# Patient Record
Sex: FEMALE | Race: WHITE | URBAN - METROPOLITAN AREA
[De-identification: names, ages, dates, MRNs, and addresses within clinical notes are randomized per-mention and may not be internally consistent; named-entity substitution may affect disease eponyms.]

---

## 2017-10-01 ENCOUNTER — APPOINTMENT (OUTPATIENT)
Dept: GENERAL RADIOLOGY | Facility: CLINIC | Age: 67
End: 2017-10-01
Attending: EMERGENCY MEDICINE
Payer: COMMERCIAL

## 2017-10-01 ENCOUNTER — HOSPITAL ENCOUNTER (EMERGENCY)
Facility: CLINIC | Age: 67
Discharge: HOME | End: 2017-10-01
Attending: EMERGENCY MEDICINE | Admitting: EMERGENCY MEDICINE
Payer: COMMERCIAL

## 2017-10-01 ENCOUNTER — APPOINTMENT (OUTPATIENT)
Dept: CT IMAGING | Facility: CLINIC | Age: 67
End: 2017-10-01
Attending: EMERGENCY MEDICINE
Payer: COMMERCIAL

## 2017-10-01 VITALS
RESPIRATION RATE: 16 BRPM | HEART RATE: 89 BPM | WEIGHT: 170 LBS | BODY MASS INDEX: 26.68 KG/M2 | HEIGHT: 67 IN | SYSTOLIC BLOOD PRESSURE: 165 MMHG | TEMPERATURE: 98.1 F | DIASTOLIC BLOOD PRESSURE: 102 MMHG | OXYGEN SATURATION: 98 %

## 2017-10-01 DIAGNOSIS — S16.1XXA STRAIN OF NECK MUSCLE, INITIAL ENCOUNTER: ICD-10-CM

## 2017-10-01 DIAGNOSIS — S30.0XXA CONTUSION OF PELVIS, INITIAL ENCOUNTER: ICD-10-CM

## 2017-10-01 PROCEDURE — 25000132 ZZH RX MED GY IP 250 OP 250 PS 637: Performed by: EMERGENCY MEDICINE

## 2017-10-01 PROCEDURE — 99284 EMERGENCY DEPT VISIT MOD MDM: CPT | Mod: 25

## 2017-10-01 PROCEDURE — 72125 CT NECK SPINE W/O DYE: CPT

## 2017-10-01 PROCEDURE — 72170 X-RAY EXAM OF PELVIS: CPT

## 2017-10-01 RX ORDER — CYCLOBENZAPRINE HCL 10 MG
10 TABLET ORAL EVERY 8 HOURS PRN
Qty: 20 TABLET | Refills: 0 | Status: SHIPPED | OUTPATIENT
Start: 2017-10-01 | End: 2019-11-18

## 2017-10-01 RX ORDER — ACETAMINOPHEN 500 MG
1000 TABLET ORAL ONCE
Status: COMPLETED | OUTPATIENT
Start: 2017-10-01 | End: 2017-10-01

## 2017-10-01 RX ADMIN — ACETAMINOPHEN 1000 MG: 500 TABLET, FILM COATED ORAL at 15:02
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
## 2017-10-01 NOTE — ED AVS SNAPSHOT
Emergency Department    [address] [place] [address]    Phone:  [phone]    Fax:  [phone]                                       Yue Mancilla   MRN: [id number]    Department:   Emergency Department   Date of Visit:  10/1/2017           After Visit Summary Signature Page     I have received my discharge instructions, and my questions have been answered. I have discussed any challenges I see with this plan with the nurse or doctor.    ..........................................................................................................................................  Patient/Patient Representative Signature      ..........................................................................................................................................  Patient Representative Print Name and Relationship to Patient    ..................................................               ................................................  Date                                            Time    ..........................................................................................................................................  Reviewed by Signature/Title    ...................................................              ..............................................  Date                                                            Time
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
## 2018-08-14 ENCOUNTER — TRANSFERRED RECORDS (OUTPATIENT)
Dept: HEALTH INFORMATION MANAGEMENT | Facility: CLINIC | Age: 68
End: 2018-08-14

## 2018-08-16 ENCOUNTER — PRE VISIT (OUTPATIENT)
Dept: DERMATOLOGY | Facility: CLINIC | Age: 68
End: 2018-08-16

## 2018-08-16 NOTE — TELEPHONE ENCOUNTER
FUTURE VISIT INFORMATION      FUTURE VISIT INFORMATION:    Date: N/A    Time:     Location:   REFERRAL INFORMATION:    Referring provider:  DR. CLEVELAND CAMPBELL    Referring providers clinic:  [facility]    Reason for visit/diagnosis  HAIRLOSS    RECORDS STATUS      RECORDS RECEIVED FROM: [facility]   DATE RECEIVED: 8/14/18   NOTES STATUS DETAILS   OFFICE NOTE from referring provider Received    OFFICE NOTE from other specialist N/A    SCALP BIOPSY RESULTS     HAIR LOSS PHOTOS     LAB RESULTS     MEDICATION LIST Received      Action    Action Taken ALL RECORDS RECEIVED AND WILL BE REVIEWED BY DR. DRAKE WITHIN 2-3 WEEKS

## 2018-08-21 ENCOUNTER — MEDICAL CORRESPONDENCE (OUTPATIENT)
Dept: HEALTH INFORMATION MANAGEMENT | Facility: CLINIC | Age: 68
End: 2018-08-21

## 2018-09-06 ENCOUNTER — TELEPHONE (OUTPATIENT)
Dept: DERMATOLOGY | Facility: CLINIC | Age: 68
End: 2018-09-06

## 2018-09-06 NOTE — TELEPHONE ENCOUNTER
Writer called patient to inform her she has been accepted into Dr. Hampton's [facility] clinic. Patient is scheduled on 12/11/18 for an appointment.

## 2018-09-06 NOTE — TELEPHONE ENCOUNTER
[facility] Call Center    Phone Message    May a detailed message be left on voicemail: yes    Reason for Call: Other: Pt called wanting an update on the status of her hairloss referral to Dr. Hampton. Called procedure  but was not able to get a hold of them. Please call pt back with update. Thanks     Action Taken: Message routed to:  [facility] ([facility] Dermatology

## 2018-09-06 NOTE — TELEPHONE ENCOUNTER
Action    Action Taken DR. DRAKE HAS ACCEPTED PATIENT INTO CLINIC. PATIENT MAY SCHEDULE [facility] APPOINTMENT.

## 2018-12-08 LAB — MAMMOGRAM: NORMAL

## 2018-12-11 ENCOUNTER — OFFICE VISIT (OUTPATIENT)
Dept: DERMATOLOGY | Facility: CLINIC | Age: 68
End: 2018-12-11
Payer: MEDICARE

## 2018-12-11 ENCOUNTER — PATIENT OUTREACH (OUTPATIENT)
Dept: CARE COORDINATION | Facility: CLINIC | Age: 68
End: 2018-12-11

## 2018-12-11 VITALS — HEART RATE: 76 BPM | DIASTOLIC BLOOD PRESSURE: 69 MMHG | SYSTOLIC BLOOD PRESSURE: 128 MMHG

## 2018-12-11 DIAGNOSIS — E55.9 VITAMIN D DEFICIENCY: ICD-10-CM

## 2018-12-11 DIAGNOSIS — L65.9 LOSS OF HAIR: ICD-10-CM

## 2018-12-11 DIAGNOSIS — L29.9 SCALP PRURITUS: ICD-10-CM

## 2018-12-11 DIAGNOSIS — L65.9 ALOPECIA: Primary | ICD-10-CM

## 2018-12-11 PROCEDURE — 88305 TISSUE EXAM BY PATHOLOGIST: CPT | Mod: TC | Performed by: DERMATOLOGY

## 2018-12-11 RX ORDER — LIDOCAINE 50 MG/G
OINTMENT TOPICAL
COMMUNITY
Start: 2018-02-01

## 2018-12-11 RX ORDER — LOSARTAN POTASSIUM 50 MG/1
50 TABLET ORAL
COMMUNITY
Start: 2018-05-03

## 2018-12-11 RX ORDER — KETOCONAZOLE 20 MG/ML
SHAMPOO TOPICAL
COMMUNITY
Start: 2018-10-16 | End: 2019-11-18

## 2018-12-11 ASSESSMENT — PAIN SCALES - GENERAL
PAINLEVEL: MODERATE PAIN (5)
PAINLEVEL: NO PAIN (0)

## 2018-12-11 NOTE — PROGRESS NOTES
Lidocaine-epinephrine 1-1:485363 % injection   0.5mL once for one use, starting 12/11/2018 ending 12/11/2018,  2mL disp, R-0, injection  Injected by Dr. Wan
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
## 2018-12-11 NOTE — PATIENT INSTRUCTIONS

## 2018-12-11 NOTE — NURSING NOTE
Dermatology Rooming Note    Yue Mancilla's goals for this visit include:   Chief Complaint   Patient presents with     Derm Problem     Yue is here for concerns of LPP, was referred by Dr. Thomas Davenport.       Trudy Simon LPN
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
## 2018-12-17 ENCOUNTER — TRANSFERRED RECORDS (OUTPATIENT)
Dept: HEALTH INFORMATION MANAGEMENT | Facility: CLINIC | Age: 68
End: 2018-12-17

## 2019-01-02 LAB — COPATH REPORT: NORMAL

## 2019-01-18 LAB — LAB SCANNED RESULT: NORMAL

## 2019-01-31 ENCOUNTER — DOCUMENTATION ONLY (OUTPATIENT)
Dept: DERMATOLOGY | Facility: CLINIC | Age: 69
End: 2019-01-31

## 2019-01-31 NOTE — PROGRESS NOTES
Records received from [facility] 12/19.Dr Hampton reviewed records and will be sent to scanning.

## 2019-02-17 ENCOUNTER — MYC MEDICAL ADVICE (OUTPATIENT)
Dept: DERMATOLOGY | Facility: CLINIC | Age: 69
End: 2019-02-17

## 2019-02-17 ENCOUNTER — TELEPHONE (OUTPATIENT)
Dept: DERMATOLOGY | Facility: CLINIC | Age: 69
End: 2019-02-17

## 2019-02-17 DIAGNOSIS — R20.2 PARESTHESIA: Primary | ICD-10-CM

## 2019-02-18 NOTE — TELEPHONE ENCOUNTER
This afternoon I had the opportunity to speak with MsCatalino Laurent regarding her data from her recent dermatology visit. She reports that she continues to exp

## 2019-02-18 NOTE — TELEPHONE ENCOUNTER
"This afternoon I had the opportunity to review the data from Ms. Mancilla's recent visits to dermatology clinic. Since this visit, she notes she continues to have a symptomatic scalp and does use a betamethasone solution as needed She also notes she has a very difficult case of sleep apnea and does wear a device with an attached head gear but this does not seem to aggravate her scalp symptoms. She continues to color her hair monthly and does note some irritation at the nape of her neck but notes this is different and in a different location from where she experiences the \"creepy crawly\" sensation in her skin. Finally, Ms. Mancilla notes she started to use 2% ketoconazole in November and did find this to be helpful already in December. She now uses this about 2 to 3 times per week. She also saw Dr. Davenport last week and received IL injections to the scalp.    Her biopsy and epidermal nerve reports were as follows:    The most notable findings in this specimen are of thinned epidermis and   abundant solar elastosis, reflecting chronic photodamage.  Bacterial colonies are seen within follicular ostia, raising the possibility of an   occult folliculitis, though no significant perifollicular inflammation is   seen.  No definite features of a primary alopecia or of an active inflammatory process are identified on multiple level sections.  Clinical   correlation is recommended.     Vertical sections demonstrate 10 hair follicles, one of which is vellus,   the rest terminal, and 8 in anagen phase and 2 in telogen.  Sebaceous glands are retained, no scarring fibrous tracts are seen, and the overlying   epidermis appears thinned but otherwise normal.  A sparse perivascular   lymphocytic infiltrate is present, but no definite active inflammatory process is identified.     The nerve study showed a decrease in epidermal nerve fiber density suggestive of a neuropathy as well as mild mast cell degranulation in the mid and deep dermis. " "    Based on this information,the following is recommended:  1. Use 2% ketoconazole at least 3 times per week - every other day  2. Add 6% topical gabapentin twice a day  3. Begin Allegra 180 daily  4. Keep April appointment  5. Focus on hair regrowth after both neurogenic and more \"normal\" inflammation are under control.    Patient was seen and examined with the dermatology resident. I agree with the history, review of systems, physical examination, assessments and plan.      Rachel Hampton MD  Professor and  Chair  Department of Dermatology  [facility]    "

## 2019-04-08 ENCOUNTER — OFFICE VISIT (OUTPATIENT)
Dept: DERMATOLOGY | Facility: CLINIC | Age: 69
End: 2019-04-08
Payer: MEDICARE

## 2019-04-08 DIAGNOSIS — L65.9 ALOPECIA: ICD-10-CM

## 2019-04-08 DIAGNOSIS — L30.9 DERMATITIS: Primary | ICD-10-CM

## 2019-04-08 DIAGNOSIS — R20.2 PARESTHESIA: ICD-10-CM

## 2019-04-08 DIAGNOSIS — L29.9 SCALP PRURITUS: ICD-10-CM

## 2019-04-08 RX ORDER — FLUOCINOLONE ACETONIDE 0.11 MG/ML
OIL TOPICAL
Qty: 118 BOTTLE | Refills: 3 | Status: SHIPPED | OUTPATIENT
Start: 2019-04-08 | End: 2019-07-29

## 2019-04-08 ASSESSMENT — PAIN SCALES - GENERAL: PAINLEVEL: NO PAIN (1)

## 2019-04-08 NOTE — LETTER
"4/8/2019       RE: Yue Mancilla  [address] [facility] [address]     Dear Colleague,    Thank you for referring your patient, Yue Mancilla, to the [facility] DERMATOLOGY at [facility]. Please see a copy of my visit note below.    [facility] Dermatology Note      Dermatology Problem List:  1. Symptomatic Scalp - itching,burning and tenderness with associated hair loss. 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 Punch biopsy x 2 for H&E and nerve fiber studies 12/11/18.    Continue gabapentin 6% daily    Continue ketoconazole shampoo every other day     Continue allegra 180 mg daily     Start Derma-Smoothe/FS 1x weekly   -Previous tx: ILK, topical betamethasone    Encounter Date: Apr 8, 2019    CC:  Chief Complaint   Patient presents with     Hair Loss     Yue is here today for a 4 month hair loss follow up. Yue notes\" Im doing good\"          History of Present Illness:  Ms. Yue Mancilla is a 68 year old female who presents as a follow-up for symptomatic scalp. The patient was last seen 12/11/2018 when her scalp was biopsied. She does not experience burning and itching sensations as often and the tingling is almost gone. She took a trip without her gabapentin for 4 days and the symptoms are coming back. She feels that her hair is shedding less. She also notes that her skin is dry.     Current treatment includes 2% ketoconazole at least 3 times per week, 6% topical gabapentin twice a day, and Allegra 180 daily.     No new medications or medical conditions.     She fell and damaged her rotator cuff, she will have surgery later this month.     Past Medical History:   There is no problem list on file for this patient.    Past Medical History:   Diagnosis Date     DJD (degenerative joint disease)      Sleep apnea      No past surgical history on file.    Social History:  Patient reports that she has quit smoking. She has never used smokeless tobacco. She reports that she " drinks alcohol. She reports that she does not use drugs.    Family History:  No family history on file.    Medications:  Current Outpatient Medications   Medication Sig Dispense Refill     Calcium-Magnesium-Vitamin D (CALCIUM 500 PO)        Celecoxib (CELEBREX PO)        Cholecalciferol (VITAMIN D PO)        gabapentin 6 % SOLN solution Apply 1 ml twice a day to affected scalp 60 mL 3     ketoconazole (NIZORAL) 2 % external shampoo        lidocaine (XYLOCAINE) 5 % external ointment        losartan (COZAAR) 50 MG tablet Take 50 mg by mouth       Omeprazole (PRILOSEC PO)        vitamin B complex with vitamin C (VITAMIN  B COMPLEX) tablet Take 1 tablet by mouth daily       VITAMIN E EX        cyclobenzaprine (FLEXERIL) 10 MG tablet Take 1 tablet (10 mg) by mouth every 8 hours as needed for muscle spasms No driving a car or drinking alcohol for 8 hours after taking this medication. (Patient not taking: Reported on 12/11/2018) 20 tablet 0     Allergies   Allergen Reactions     Ceclor [Cefaclor]      Lisinopril Cough         Review of Systems:  -As per HPI  -Constitutional: Otherwise feeling well today, in usual state of health.  -HEENT: Patient denies nonhealing oral sores.  -Skin: As above in HPI. No additional skin concerns.    Physical exam:  Vitals: There were no vitals taken for this visit.  GEN: This is a well developed, well-nourished female in no acute distress, in a pleasant mood.    SKIN: Focused examination of the scalp, face, nails was performed.  - increased hair density along the frontal hair line, with new growth fibers  - Scalp is diffusely erythematous similar to previous photos  - follicular accentuation and scale throughout scalp  - bilateral eyebrows and lashes are full   - morphology of nails is normal  -No other lesions of concern on areas examined.     Impression/Plan:  1. Symptomatic scalp - low density of nerves can be associated with a neuropathy      Continue gabapentin 6% daily    Continue  ketoconazole shampoo every other day     Continue allegra 180 mg daily    Start Derma-Smoothe/FS 1x weekly       Follow-up in 3 months, earlier for new or changing lesions.     Staff Involved:  Prerna Hayden MD  Dermatology Research Fellow     Patient was seen and examined with the clinical research fellow.  I agree with the history, review of systems, physical examination, assessments and plan.    Rachel Hampton MD  Professor and  Chair  Department of Dermatology  [facility]

## 2019-04-08 NOTE — NURSING NOTE
"Dermatology Rooming Note    Yue Mancilla's goals for this visit include:   Chief Complaint   Patient presents with     Hair Loss     Yue is here today for a 4 month hair loss follow up. Yue notes\" Im doing good\"      CARRIE Rolon    "

## 2019-04-08 NOTE — PROGRESS NOTES
"[facility] Dermatology Note      Dermatology Problem List:  1. Symptomatic Scalp - itching,burning and tenderness with associated hair loss. 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 Punch biopsy x 2 for H&E and nerve fiber studies 12/11/18.    Continue gabapentin 6% daily    Continue ketoconazole shampoo every other day     Continue allegra 180 mg daily     Start Derma-Smoothe/FS 1x weekly   -Previous tx: ILK, topical betamethasone    Encounter Date: Apr 8, 2019    CC:  Chief Complaint   Patient presents with     Hair Loss     Yue is here today for a 4 month hair loss follow up. Yue notes\" Im doing good\"          History of Present Illness:  Ms. Yue Mancilla is a 68 year old female who presents as a follow-up for symptomatic scalp. The patient was last seen 12/11/2018 when her scalp was biopsied. She does not experience burning and itching sensations as often and the tingling is almost gone. She took a trip without her gabapentin for 4 days and the symptoms are coming back. She feels that her hair is shedding less. She also notes that her skin is dry.     Current treatment includes 2% ketoconazole at least 3 times per week, 6% topical gabapentin twice a day, and Allegra 180 daily.     No new medications or medical conditions.     She fell and damaged her rotator cuff, she will have surgery later this month.     Past Medical History:   There is no problem list on file for this patient.    Past Medical History:   Diagnosis Date     DJD (degenerative joint disease)      Sleep apnea      No past surgical history on file.    Social History:  Patient reports that she has quit smoking. She has never used smokeless tobacco. She reports that she drinks alcohol. She reports that she does not use drugs.    Family History:  No family history on file.    Medications:  Current Outpatient Medications   Medication Sig Dispense Refill     Calcium-Magnesium-Vitamin D (CALCIUM 500 PO)        Celecoxib (CELEBREX PO)    "     Cholecalciferol (VITAMIN D PO)        gabapentin 6 % SOLN solution Apply 1 ml twice a day to affected scalp 60 mL 3     ketoconazole (NIZORAL) 2 % external shampoo        lidocaine (XYLOCAINE) 5 % external ointment        losartan (COZAAR) 50 MG tablet Take 50 mg by mouth       Omeprazole (PRILOSEC PO)        vitamin B complex with vitamin C (VITAMIN  B COMPLEX) tablet Take 1 tablet by mouth daily       VITAMIN E EX        cyclobenzaprine (FLEXERIL) 10 MG tablet Take 1 tablet (10 mg) by mouth every 8 hours as needed for muscle spasms No driving a car or drinking alcohol for 8 hours after taking this medication. (Patient not taking: Reported on 12/11/2018) 20 tablet 0     Allergies   Allergen Reactions     Ceclor [Cefaclor]      Lisinopril Cough         Review of Systems:  -As per HPI  -Constitutional: Otherwise feeling well today, in usual state of health.  -HEENT: Patient denies nonhealing oral sores.  -Skin: As above in HPI. No additional skin concerns.    Physical exam:  Vitals: There were no vitals taken for this visit.  GEN: This is a well developed, well-nourished female in no acute distress, in a pleasant mood.    SKIN: Focused examination of the scalp, face, nails was performed.  - increased hair density along the frontal hair line, with new growth fibers  - Scalp is diffusely erythematous similar to previous photos  - follicular accentuation and scale throughout scalp  - bilateral eyebrows and lashes are full   - morphology of nails is normal  -No other lesions of concern on areas examined.     Impression/Plan:  1. Symptomatic scalp - low density of nerves can be associated with a neuropathy      Continue gabapentin 6% daily    Continue ketoconazole shampoo every other day     Continue allegra 180 mg daily    Start Derma-Smoothe/FS 1x weekly       Follow-up in 3 months, earlier for new or changing lesions.     Staff Involved:  Prerna Hayden MD  Dermatology Research Fellow     Patient was seen and  examined with the clinical research fellow.  I agree with the history, review of systems, physical examination, assessments and plan.    Rachel Hampton MD  Professor and  Chair  Department of Dermatology  [facility]

## 2019-06-27 ENCOUNTER — DOCUMENTATION ONLY (OUTPATIENT)
Dept: CARE COORDINATION | Facility: CLINIC | Age: 69
End: 2019-06-27

## 2019-07-29 ENCOUNTER — OFFICE VISIT (OUTPATIENT)
Dept: DERMATOLOGY | Facility: CLINIC | Age: 69
End: 2019-07-29
Payer: MEDICARE

## 2019-07-29 VITALS — DIASTOLIC BLOOD PRESSURE: 79 MMHG | HEART RATE: 87 BPM | SYSTOLIC BLOOD PRESSURE: 120 MMHG

## 2019-07-29 DIAGNOSIS — R20.2 PARESTHESIA: ICD-10-CM

## 2019-07-29 DIAGNOSIS — L30.9 DERMATITIS: ICD-10-CM

## 2019-07-29 RX ORDER — NYSTATIN 100000 U/G
CREAM TOPICAL 2 TIMES DAILY
COMMUNITY

## 2019-07-29 RX ORDER — FLUOCINOLONE ACETONIDE 0.11 MG/ML
OIL TOPICAL
Qty: 118 BOTTLE | Refills: 3 | Status: SHIPPED | OUTPATIENT
Start: 2019-07-29

## 2019-07-29 ASSESSMENT — PAIN SCALES - GENERAL: PAINLEVEL: NO PAIN (0)

## 2019-07-29 NOTE — NURSING NOTE
"Dermatology Rooming Note    Yue Mancilla's goals for this visit include:   Chief Complaint   Patient presents with     Hair Loss     Yue is here today for a hair loss follow up- Yue states \"it's been hard putting some of my medications on my scalp since my surgery\".      Farheen Montemayor, RMLEIGH     "

## 2019-07-29 NOTE — PATIENT INSTRUCTIONS
In about 6 weeks, discuss your scalp symptoms with Dr. Theresa Malave. If not improved, consider adding 1 pill of gabapentin each night.    For under the breasts, you can try both over the counter hydrocortisone and Lotrimin or clotrimazole as needed.

## 2019-07-29 NOTE — LETTER
"7/29/2019       RE: Yue Mancilla  [address] [facility] [address]     Dear Colleague,    Thank you for referring your patient, Yue Mancilla, to the [facility] DERMATOLOGY at [facility]. Please see a copy of my visit note below.                    Pictures were placed in Pt's chart today for future reference.      [facility] Dermatology Note      Dermatology Problem List:  1. Symptomatic Scalp - itching,burning and tenderness with associated hair loss. 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 Punch biopsy x 2 for H&E (normal scalp) and nerve fiber studies 12/11/18 (low density of nerve fibers) compatible with a neuropathy    Continue gabapentin 6% daily    Continue 2% ketoconazole shampoo every other day     Continue allegra 180 mg daily     Start Derma-Smoothe/FS 1x weekly   -Previous tx: ILK, topical betamethasone    2. Itching under breasts  1. - Symptomatic scalp - low density of epidermal  nerves is indicative of  a neuropathy      Continue gabapentin 6% daily    Continue ketoconazole shampoo every other day     Continue allegra 180 mg daily    Start Derma-Smoothe/FS at a rate of 1x weekly     Continue Zoloft as prescribed by her PCP. In about 1 month, see if this has helped scalp symptoms. If not, consider adding oral gabapentin once nightly.    2. Inframammary itching -     Over-the-counter hydrocortisone and Lotrimin     Discontinue topical nystatin    Encounter Date: Jul 29, 2019    CC:  Chief Complaint   Patient presents with     Hair Loss     Yue is here today for a hair loss follow up- Yue states \"it's been hard putting some of my medications on my scalp since my surgery\".          History of Present Illness:  Ms. Yue Mancilla is a 68 year old female who presents as a follow-up for symptomatic scalp with associated hair loss. The patient was last seen on 4/8/19 when she was continued on her topical gabapentin, ketoconazole shampoo, daily allegra, and " started on Dermasmooth. Today, she feels like her scalp is stable, like she has plateaued. She notes that after initiating gabapentin her scalp symptoms improved significantly; however she was hoping for further relief. She still experiences pangs of pain at random times of day on her scalp crown and in a band along the back of the scalp. Her hair loss is stable. Since her last visit, she had rotator cuff surgery which makes it hard to apply topicals; therefore she has only tried the dermasmoothe 3x. She also has developed itching under her breasts; her PCP prescribed nystatin which she feels is not helping. She was also started on Zoloft 2 days ago by her PCP.    Past Medical History:   There is no problem list on file for this patient.    Past Medical History:   Diagnosis Date     DJD (degenerative joint disease)      Sleep apnea      No past surgical history on file.    Social History:  Patient reports that she has quit smoking. She has never used smokeless tobacco. She reports that she drinks alcohol. She reports that she does not use drugs.    Family History:  No family history on file.    Medications:  Current Outpatient Medications   Medication Sig Dispense Refill     Calcium-Magnesium-Vitamin D (CALCIUM 500 PO)        Celecoxib (CELEBREX PO)        Cholecalciferol (VITAMIN D PO)        Fluocinolone Acetonide Scalp (DERMA-SMOOTHE/FS SCALP) 0.01 % OIL oil Massage 1-2 caps full into the scalp and leave for 4 hours or overnight then shampoo. Do weekly as needed. 118 Bottle 3     gabapentin 6 % SOLN solution Apply 1 ml twice a day to affected scalp 60 mL 3     ketoconazole (NIZORAL) 2 % external shampoo        lidocaine (XYLOCAINE) 5 % external ointment        losartan (COZAAR) 50 MG tablet Take 50 mg by mouth       nystatin (MYCOSTATIN) 301887 UNIT/GM external cream Apply topically 2 times daily       Omeprazole (PRILOSEC PO)        vitamin B complex with vitamin C (VITAMIN  B COMPLEX) tablet Take 1 tablet by  mouth daily       VITAMIN E EX        cyclobenzaprine (FLEXERIL) 10 MG tablet Take 1 tablet (10 mg) by mouth every 8 hours as needed for muscle spasms No driving a car or drinking alcohol for 8 hours after taking this medication. (Patient not taking: Reported on 12/11/2018) 20 tablet 0     Allergies   Allergen Reactions     Ceclor [Cefaclor]      Lisinopril Cough         Review of Systems:  -As per HPI. Difficulty raising left shoulder above a 90 degree angle.  -Constitutional: Otherwise feeling well today, in usual state of health.  -HEENT: Patient denies nonhealing oral sores.  -Skin: As above in HPI. No additional skin concerns.    Physical exam:  Vitals: /79 (BP Location: Right arm, Patient Position: Sitting, Cuff Size: Adult Regular)   Pulse 87   GEN: This is a well developed, well-nourished female in no acute distress, in a pleasant mood.    SKIN: Focused examination of the scalp and face was performed.  - Scalp: - Examination of the scalp revealed Shan part width of 1.1. There was some background erythema and scaling of the scalp noted. In comparison to prior photographs, her hair regrowth is quite stable with some mild regrowth on the temporal scalp.  - Eyelashes and eyebrows normal density.  - Under breasts: rare, subtle scattered pink papules and excoriations  -No other lesions of concern on areas examined.     Impression/Plan:  2. Symptomatic scalp - low density of epidermal  nerves is indicative of  a neuropathy      Continue gabapentin 6% daily    Continue ketoconazole shampoo every other day     Continue allegra 180 mg daily    Start Derma-Smoothe/FS at a rate of 1x weekly     Continue Zoloft as prescribed by her PCP. In about 1 month, see if this has helped scalp symptoms. If not, consider adding oral gabapentin once nightly.    2. Inframammary itching -     Over-the-counter hydrocortisone and Lotrimin     Discontinue topical nystatin      CC Referred Self, MD  No address on file on close of  this encounter.  Follow-up in 3 months, earlier for new or changing lesions.     Staff Involved:  I,Louann Lua, saw and examined the patient in the presence of Dr. Hampton.    Staff Physician:  I was present with the medical student who participated in the service and in the documentation of the note. I have verified the history and personally performed the physical exam and medical decision making. I agree with the assessment and plan of care as documented in the note.       Rachel Hampton MD  Professor and Chair  Department of Dermatology  [facility]: Phone: [phone], Fax:[phone]  [facility]: Phone: [phone], Fax: [phone]                 Again, thank you for allowing me to participate in the care of your patient.      Sincerely,    Rachel Hampton MD

## 2019-09-13 ENCOUNTER — PRE VISIT (OUTPATIENT)
Dept: DERMATOLOGY | Facility: CLINIC | Age: 69
End: 2019-09-13

## 2019-09-13 NOTE — TELEPHONE ENCOUNTER
FUTURE VISIT INFORMATION      FUTURE VISIT INFORMATION:    Date: n/a    Time:     Location:   REFERRAL INFORMATION:    Referring provider:  Dr.Paul Davenport     Referring providers clinic:  [facility]    Reason for visit/diagnosis  Lichen Planopilaris    RECORDS REQUESTED FROM:         DATE RECEIVED: 09/13/19   NOTES STATUS DETAILS   OFFICE NOTE from referring provider Received    OFFICE NOTE from other specialist N/A    LAB RESULTS N/A    MEDICATION LIST N/A    SCALP BIOPSY RESULTS N/A    HAIR LOSS PHOTOS N/A      Action    Action Taken Records received and will be reviewed by  in 4-8 weeks . Pt will be contacted

## 2019-11-18 ENCOUNTER — OFFICE VISIT (OUTPATIENT)
Dept: DERMATOLOGY | Facility: CLINIC | Age: 69
End: 2019-11-18
Payer: MEDICARE

## 2019-11-18 VITALS — HEART RATE: 75 BPM | DIASTOLIC BLOOD PRESSURE: 80 MMHG | SYSTOLIC BLOOD PRESSURE: 138 MMHG

## 2019-11-18 DIAGNOSIS — R20.2 PARESTHESIA: ICD-10-CM

## 2019-11-18 DIAGNOSIS — L30.9 DERMATITIS: ICD-10-CM

## 2019-11-18 RX ORDER — KETOCONAZOLE 20 MG/ML
SHAMPOO TOPICAL
Qty: 120 ML | Refills: 11 | Status: SHIPPED | OUTPATIENT
Start: 2019-11-18

## 2019-11-18 RX ORDER — CITALOPRAM HYDROBROMIDE 10 MG/1
10 TABLET ORAL
COMMUNITY
Start: 2019-10-10

## 2019-11-18 ASSESSMENT — PAIN SCALES - GENERAL: PAINLEVEL: NO PAIN (0)

## 2019-11-18 NOTE — NURSING NOTE
Dermatology Rooming Note    Yue Mancilla's goals for this visit include:   Chief Complaint   Patient presents with     Hair Loss     hair loss f/u - Yue states that it is pretty much stayed the same     Bonnie Townsend, EMT

## 2019-11-18 NOTE — LETTER
11/18/2019       RE: Yue Mancilla  [address] [facility] [address]     Dear Colleague,    Thank you for referring your patient, Yue Mancilla, to the [facility] DERMATOLOGY at [facility]. Please see a copy of my visit note below.    [facility] Dermatology Note      Dermatology Problem List:  1. Symptomatic scalp - stable since last visit; itching,burning and tenderness with associated hair loss  - Current tx: topical gabapentin 6% solution 2-3x daily, ketoconazole 2% shampoo every other day, fluocinolone acetonide oil once weekly, Allegra 180 mg daily  - Previous tx: ILK, topical betamethasone  - bx 12/11/18; punch biopsy x2 for H&E (normal scalp) and nerve fiber studies; low density of nerve fibers compatible with a neuropathy; previously treated as LPP by outside dermatologist  - Photodocumentation    2. Inframammary itching  - Continue OTC hydrocortisone and Lotrimin PRN    Encounter Date: Nov 18, 2019    CC:  Chief Complaint   Patient presents with     Hair Loss     hair loss f/u - Yue states that it is pretty much stayed the same         History of Present Illness:  Ms. Yue Mancilla is a 69 year old female who presents as a follow-up for symptomatic scalp with associated hair loss. The patient was last seen on 7/29/19 when she was continued on topical gabapentin, Allegra, ketoconazole 2% shampoo, and fluocinolone acetonide oil.      Today she reports that her scalp is stable. She reports burning on the vertex and parietal scalp and pruritus on the occipital scalp. She has continued adhering to her regimen of topical gabapentin, Allegra, and ketoconazole 2% shampoo. She continued fluocinolone acetonide oil until two months ago, because she was busy preparing for her daughter's wedding. She is also taking omega 3 supplements because she has heard testimony from other people that it will help with health and wellbeing. She also notes she has arthritis  "\"everywhere except her shoulder\". No new medications or medical conditions.     Otherwise the patient is feeling well and has no other skin concerns.     Past Medical History:   There is no problem list on file for this patient.    Past Medical History:   Diagnosis Date     DJD (degenerative joint disease)      Sleep apnea      No past surgical history on file.    Social History:  Patient reports that she has quit smoking. She has never used smokeless tobacco. She reports current alcohol use. She reports that she does not use drugs.    Family History:  No family history on file.    Medications:  Current Outpatient Medications   Medication Sig Dispense Refill     Calcium-Magnesium-Vitamin D (CALCIUM 500 PO)        Celecoxib (CELEBREX PO)        Cholecalciferol (VITAMIN D PO)        citalopram (CELEXA) 10 MG tablet Take 10 mg by mouth every 48 hours       Fluocinolone Acetonide Scalp (DERMA-SMOOTHE/FS SCALP) 0.01 % OIL oil Massage 1-2 caps full into the scalp and leave for 4 hours or overnight then shampoo. Do weekly as needed. 118 Bottle 3     gabapentin 6 % SOLN solution Apply 1 ml twice a day to affected scalp 60 mL 3     ketoconazole (NIZORAL) 2 % external shampoo        lidocaine (XYLOCAINE) 5 % external ointment        losartan (COZAAR) 50 MG tablet Take 50 mg by mouth       Omeprazole (PRILOSEC PO)        vitamin B complex with vitamin C (VITAMIN  B COMPLEX) tablet Take 1 tablet by mouth daily       VITAMIN E EX        nystatin (MYCOSTATIN) 551043 UNIT/GM external cream Apply topically 2 times daily       Allergies   Allergen Reactions     Ceclor [Cefaclor]      Lisinopril Cough         Review of Systems:  -As per HPI. Difficulty raising left shoulder above a 90 degree angle.  -Constitutional: Otherwise feeling well today, in usual state of health.  -HEENT: Patient denies nonhealing oral sores.  -Skin: As above in HPI. No additional skin concerns.    Physical exam:  Vitals: /80 (BP Location: Right arm, " Patient Position: Sitting, Cuff Size: Adult Regular)   Pulse 75   GEN: This is a well developed, well-nourished female in no acute distress, in a pleasant mood.    SKIN: Focused examination of the scalp and face  was performed.  - Small, white new fiber regrowth on the temporal scalp  - Diffuse scalp erythema  - Small Sebastien-tree pattern on frontal scalp  - Part width of 1.1 anterialy, 1.2 on mid and posterior scalp, 1.1-1.2 in back  - Regrowth layers:    1st: 1 cm, scattered   2nd: 4-5 cm   3rd: 10-12 cm, robust   Frontal hairline: 1-3 cm  - Healthy scalp  - No other lesions of concern on areas examined.     Impression/Plan:  1. Symptomatic scalp with alopecia  - stable since last visit; low density of epidermal nerves is indicative of a neuropathy      Continue gabapentin 6% 2-3x daily    Continue ketoconazole 2% shampoo every other day     Continue Allegra 180 mg daily    Continue fluocinolone acetonide oil once weekly    Photodocumentation      Follow-up in 5-6 months, earlier for new or changing lesions.      Staff Involved:  Scribe/Staff    Scribe Disclosure:  I, Unique Ledezma, am serving as a scribe to document services personally performed by Dr. Rachel Hampton MD, based on data collection and the provider's statements to me.     Provider Disclosure:   I agree with above History, Review of Systems, Physical exam and Plan. I have reviewed the content of the documentation and have edited it as needed. I have personally performed the services documented here and the documentation accurately represents those services and the decisions I have made.     Rachel Hampton MD  Professor and Chair  Department of Dermatology  [facility]

## 2019-11-18 NOTE — PROGRESS NOTES
"[facility] Dermatology Note      Dermatology Problem List:  1. Symptomatic scalp - stable since last visit; itching,burning and tenderness with associated hair loss  - Current tx: topical gabapentin 6% solution 2-3x daily, ketoconazole 2% shampoo every other day, fluocinolone acetonide oil once weekly, Allegra 180 mg daily  - Previous tx: ILK, topical betamethasone  - bx 12/11/18; punch biopsy x2 for H&E (normal scalp) and nerve fiber studies; low density of nerve fibers compatible with a neuropathy; previously treated as LPP by outside dermatologist  - Photodocumentation    2. Inframammary itching  - Continue OTC hydrocortisone and Lotrimin PRN    Encounter Date: Nov 18, 2019    CC:  Chief Complaint   Patient presents with     Hair Loss     hair loss f/u - Yue states that it is pretty much stayed the same         History of Present Illness:  Ms. Yue Mancilla is a 69 year old female who presents as a follow-up for symptomatic scalp with associated hair loss. The patient was last seen on 7/29/19 when she was continued on topical gabapentin, Allegra, ketoconazole 2% shampoo, and fluocinolone acetonide oil.      Today she reports that her scalp is stable. She reports burning on the vertex and parietal scalp and pruritus on the occipital scalp. She has continued adhering to her regimen of topical gabapentin, Allegra, and ketoconazole 2% shampoo. She continued fluocinolone acetonide oil until two months ago, because she was busy preparing for her daughter's wedding. She is also taking omega 3 supplements because she has heard testimony from other people that it will help with health and wellbeing. She also notes she has arthritis \"everywhere except her shoulder\". No new medications or medical conditions.     Otherwise the patient is feeling well and has no other skin concerns.     Past Medical History:   There is no problem list on file for this patient.    Past Medical History:   Diagnosis Date     " DJD (degenerative joint disease)      Sleep apnea      No past surgical history on file.    Social History:  Patient reports that she has quit smoking. She has never used smokeless tobacco. She reports current alcohol use. She reports that she does not use drugs.    Family History:  No family history on file.    Medications:  Current Outpatient Medications   Medication Sig Dispense Refill     Calcium-Magnesium-Vitamin D (CALCIUM 500 PO)        Celecoxib (CELEBREX PO)        Cholecalciferol (VITAMIN D PO)        citalopram (CELEXA) 10 MG tablet Take 10 mg by mouth every 48 hours       Fluocinolone Acetonide Scalp (DERMA-SMOOTHE/FS SCALP) 0.01 % OIL oil Massage 1-2 caps full into the scalp and leave for 4 hours or overnight then shampoo. Do weekly as needed. 118 Bottle 3     gabapentin 6 % SOLN solution Apply 1 ml twice a day to affected scalp 60 mL 3     ketoconazole (NIZORAL) 2 % external shampoo        lidocaine (XYLOCAINE) 5 % external ointment        losartan (COZAAR) 50 MG tablet Take 50 mg by mouth       Omeprazole (PRILOSEC PO)        vitamin B complex with vitamin C (VITAMIN  B COMPLEX) tablet Take 1 tablet by mouth daily       VITAMIN E EX        nystatin (MYCOSTATIN) [id number] UNIT/GM external cream Apply topically 2 times daily       Allergies   Allergen Reactions     Ceclor [Cefaclor]      Lisinopril Cough         Review of Systems:  -As per HPI. Difficulty raising left shoulder above a 90 degree angle.  -Constitutional: Otherwise feeling well today, in usual state of health.  -HEENT: Patient denies nonhealing oral sores.  -Skin: As above in HPI. No additional skin concerns.    Physical exam:  Vitals: /80 (BP Location: Right arm, Patient Position: Sitting, Cuff Size: Adult Regular)   Pulse 75   GEN: This is a well developed, well-nourished female in no acute distress, in a pleasant mood.    SKIN: Focused examination of the scalp and face  was performed.  - Small, white new fiber regrowth on the  temporal scalp  - Diffuse scalp erythema  - Small Sebastien-tree pattern on frontal scalp  - Part width of 1.1 anterialy, 1.2 on mid and posterior scalp, 1.1-1.2 in back  - Regrowth layers:    1st: 1 cm, scattered   2nd: 4-5 cm   3rd: 10-12 cm, robust   Frontal hairline: 1-3 cm  - Healthy scalp  - No other lesions of concern on areas examined.     Impression/Plan:  1. Symptomatic scalp with alopecia  - stable since last visit; low density of epidermal nerves is indicative of a neuropathy      Continue gabapentin 6% 2-3x daily    Continue ketoconazole 2% shampoo every other day     Continue Allegra 180 mg daily    Continue fluocinolone acetonide oil once weekly    Photodocumentation      Follow-up in 5-6 months, earlier for new or changing lesions.      Staff Involved:  Scribe/Staff    Scribe Disclosure:  I, Unique Ledezma, am serving as a scribe to document services personally performed by Dr. Rachel Hampton MD, based on data collection and the provider's statements to me.     Provider Disclosure:   I agree with above History, Review of Systems, Physical exam and Plan. I have reviewed the content of the documentation and have edited it as needed. I have personally performed the services documented here and the documentation accurately represents those services and the decisions I have made.     Rachel Hampton MD  Professor and Chair  Department of Dermatology  [facility]

## 2020-02-04 ENCOUNTER — TELEPHONE (OUTPATIENT)
Dept: DERMATOLOGY | Facility: CLINIC | Age: 70
End: 2020-02-04
Payer: MEDICARE

## 2020-03-11 ENCOUNTER — HEALTH MAINTENANCE LETTER (OUTPATIENT)
Age: 70
End: 2020-03-11

## 2020-12-27 ENCOUNTER — HEALTH MAINTENANCE LETTER (OUTPATIENT)
Age: 70
End: 2020-12-27

## 2021-03-06 ENCOUNTER — HEALTH MAINTENANCE LETTER (OUTPATIENT)
Age: 71
End: 2021-03-06

## 2021-04-25 ENCOUNTER — HEALTH MAINTENANCE LETTER (OUTPATIENT)
Age: 71
End: 2021-04-25

## 2021-10-09 ENCOUNTER — HEALTH MAINTENANCE LETTER (OUTPATIENT)
Age: 71
End: 2021-10-09

## 2022-05-21 ENCOUNTER — HEALTH MAINTENANCE LETTER (OUTPATIENT)
Age: 72
End: 2022-05-21

## 2022-09-17 ENCOUNTER — HEALTH MAINTENANCE LETTER (OUTPATIENT)
Age: 72
End: 2022-09-17

## 2023-05-06 ENCOUNTER — HEALTH MAINTENANCE LETTER (OUTPATIENT)
Age: 73
End: 2023-05-06

## 2023-06-04 ENCOUNTER — HEALTH MAINTENANCE LETTER (OUTPATIENT)
Age: 73
End: 2023-06-04